# Patient Record
Sex: FEMALE | Race: WHITE
[De-identification: names, ages, dates, MRNs, and addresses within clinical notes are randomized per-mention and may not be internally consistent; named-entity substitution may affect disease eponyms.]

---

## 2017-03-29 ENCOUNTER — HOSPITAL ENCOUNTER (OUTPATIENT)
Dept: HOSPITAL 80 - FIMAGING | Age: 67
End: 2017-03-29
Attending: INTERNAL MEDICINE
Payer: COMMERCIAL

## 2017-03-29 DIAGNOSIS — C50.812: Primary | ICD-10-CM

## 2017-03-29 DIAGNOSIS — C79.51: ICD-10-CM

## 2017-03-29 PROCEDURE — 78306 BONE IMAGING WHOLE BODY: CPT

## 2017-03-29 PROCEDURE — A9503 TC99M MEDRONATE: HCPCS

## 2017-05-03 ENCOUNTER — HOSPITAL ENCOUNTER (OUTPATIENT)
Dept: HOSPITAL 80 - BHFA | Age: 67
End: 2017-05-03
Attending: INTERNAL MEDICINE
Payer: COMMERCIAL

## 2017-05-03 DIAGNOSIS — Z51.89: Primary | ICD-10-CM

## 2017-07-25 ENCOUNTER — HOSPITAL ENCOUNTER (OUTPATIENT)
Dept: HOSPITAL 80 - FIMAGING | Age: 67
End: 2017-07-25
Attending: PHYSICIAN ASSISTANT
Payer: COMMERCIAL

## 2017-07-25 DIAGNOSIS — C50.812: ICD-10-CM

## 2017-07-25 DIAGNOSIS — C79.51: Primary | ICD-10-CM

## 2017-07-25 PROCEDURE — 78306 BONE IMAGING WHOLE BODY: CPT

## 2017-07-25 PROCEDURE — A9503 TC99M MEDRONATE: HCPCS

## 2017-09-18 ENCOUNTER — HOSPITAL ENCOUNTER (OUTPATIENT)
Dept: HOSPITAL 80 - FIMAGING | Age: 67
End: 2017-09-18
Attending: INTERNAL MEDICINE
Payer: COMMERCIAL

## 2017-09-18 DIAGNOSIS — Z45.2: Primary | ICD-10-CM

## 2017-10-17 ENCOUNTER — HOSPITAL ENCOUNTER (OUTPATIENT)
Dept: HOSPITAL 80 - FIMAGING | Age: 67
End: 2017-10-17
Attending: INTERNAL MEDICINE
Payer: COMMERCIAL

## 2017-10-17 DIAGNOSIS — C79.51: Primary | ICD-10-CM

## 2017-10-17 DIAGNOSIS — C50.812: ICD-10-CM

## 2017-10-17 PROCEDURE — CP1Z1ZZ PLANAR NUCLEAR MEDICINE IMAGING OF MUSCULOSKELETAL SYSTEM, ALL USING TECHNETIUM 99M (TC-99M): ICD-10-PCS | Performed by: RADIOLOGY

## 2017-10-17 PROCEDURE — 78306 BONE IMAGING WHOLE BODY: CPT

## 2017-10-17 PROCEDURE — A9503 TC99M MEDRONATE: HCPCS

## 2017-11-15 ENCOUNTER — HOSPITAL ENCOUNTER (OUTPATIENT)
Dept: HOSPITAL 80 - FIMAGING | Age: 67
End: 2017-11-15
Attending: NURSE PRACTITIONER
Payer: COMMERCIAL

## 2017-11-15 DIAGNOSIS — R22.0: Primary | ICD-10-CM

## 2017-11-15 DIAGNOSIS — C50.812: ICD-10-CM

## 2017-11-15 DIAGNOSIS — D70.1: ICD-10-CM

## 2017-11-15 PROCEDURE — A9585 GADOBUTROL INJECTION: HCPCS

## 2017-11-15 PROCEDURE — 70553 MRI BRAIN STEM W/O & W/DYE: CPT

## 2019-02-05 ENCOUNTER — HOSPITAL ENCOUNTER (INPATIENT)
Dept: HOSPITAL 80 - FED | Age: 69
LOS: 6 days | DRG: 177 | End: 2019-02-11
Attending: INTERNAL MEDICINE | Admitting: INTERNAL MEDICINE
Payer: COMMERCIAL

## 2019-02-05 DIAGNOSIS — R41.3: ICD-10-CM

## 2019-02-05 DIAGNOSIS — C79.2: ICD-10-CM

## 2019-02-05 DIAGNOSIS — I26.99: ICD-10-CM

## 2019-02-05 DIAGNOSIS — C78.00: ICD-10-CM

## 2019-02-05 DIAGNOSIS — Z51.5: ICD-10-CM

## 2019-02-05 DIAGNOSIS — F32.9: ICD-10-CM

## 2019-02-05 DIAGNOSIS — D53.9: ICD-10-CM

## 2019-02-05 DIAGNOSIS — Z90.13: ICD-10-CM

## 2019-02-05 DIAGNOSIS — G62.9: ICD-10-CM

## 2019-02-05 DIAGNOSIS — E86.9: ICD-10-CM

## 2019-02-05 DIAGNOSIS — J96.01: ICD-10-CM

## 2019-02-05 DIAGNOSIS — C50.919: ICD-10-CM

## 2019-02-05 DIAGNOSIS — J69.0: Primary | ICD-10-CM

## 2019-02-05 DIAGNOSIS — Z66: ICD-10-CM

## 2019-02-05 DIAGNOSIS — C79.51: ICD-10-CM

## 2019-02-05 LAB — PLATELET # BLD: 282 10^3/UL (ref 150–400)

## 2019-02-05 PROCEDURE — G0378 HOSPITAL OBSERVATION PER HR: HCPCS

## 2019-02-05 RX ADMIN — ONDANSETRON PRN MG: 2 SOLUTION INTRAMUSCULAR; INTRAVENOUS at 19:45

## 2019-02-05 RX ADMIN — MORPHINE SULFATE SCH MG: 15 TABLET, FILM COATED, EXTENDED RELEASE ORAL at 21:59

## 2019-02-05 NOTE — PDGENHP
History and Physical





- Chief Complaint


shortness of breath, fatigue





- History of Present Illness


69yo F with breast cancer with metastases to brain, bone, lung, and skin 

presents with shortness of breath and fatigue. These symptoms started acutely 

after beginning a new anti-cancer therapy, neratinib in early January. She 

reports that her oncologist had reduced the dose of this medication but she was 

still exquisitely fatigued so came to ED today. She was found to have an oxygen 

saturation of 72% on room air. CXR shows what appeared to be bilateral 

pneumonia and she was given a dose of ceftriaxone and azithromycin. Given her 

history of malignancy and an elevated d-dimer, CTA of her chest was performed 

which showed right middle lobe PE and bilateral lower lobe infiltrates with air 

bronchograms. She is being admitted for further management. Case discussed with 

ED physician Shasta Jacobs.





History Information





- Allergies/Home Medication List


Allergies/Adverse Reactions: 








SEASONAL Allergy (Intermediate, Uncoded 12/11/12 10:18)


 RUNNY NOSE/ ITCHY EYES





Home Medications: 








Citalopram [CeleXA 20 MG] 20 mg PO DAILY 05/20/14 [Last Taken 02/05/19]


Herbals/Supplements -Info Only 1 ea PO DAILY 06/03/16 [Last Taken 06/03/16]


Acetaminophen [Tylenol 325mg (*)] 325 mg PO BID 02/05/19 [Last Taken 02/05/19]


Ascorbic Acid [Vitamin C 500 mg (*)] 500 mg PO DAILY 02/05/19 [Last Taken 02/05/ 19]


Levothyroxine [Synthroid 50 mcg (*)] 50 mcg PO DAILY06 02/05/19 [Last Taken 02/ 05/19]


Neratinib Maleate [Nerlynx] 200 mg PO HS 02/05/19 [Last Taken 02/03/19]


Ondansetron HCl [Zofran] 8 mg PO BID 02/05/19 [Last Taken 02/05/19]


Pantoprazole Sodium [Protonix 40mg (*)] 40 mg PO DAILY 02/05/19 [Last Taken 02/ 05/19]


Silver Sulfadiazine [Ssd] 1 amanda TP Q2D PRN 02/05/19 [Last Taken 02/03/19]


morphINE SR [Ms Contin/Oramorph 15 mg (*)] 15 mg PO BID 02/05/19 [Last Taken 02/ 05/19]


oxyCODONE IR [Oxycodone Ir (*)] 5 mg PO Q6 PRN 02/05/19 [Last Taken Unknown]





I have personally reviewed and updated: family history, medical history, social 

history, surgical history





- Past Medical History


Additional medical history: metastatic HER2 + breast cancer (brain s/p 

stereotactic resection 12/2018, lung, skin, bone), depression, peripheral 

neuropathy, memory loss, iron deficiency anemia





- Surgical History


Additional surgical history: bilateral mastectomy w/axillary node dissection (5/ 2014)





- Family History


Positive for: non-pertinent





- Social History


Smoking Status: Never smoked


Alcohol Use: None


Drug Use: None





Review of Systems


Review of Systems: 





ROS: 10pt was reviewed & negative except for what was stated in HPI & below





Physical Exam


Physical Exam: 

















Temp Pulse Resp BP Pulse Ox


 


 36.4 C   79   16   111/63   96 


 


 02/05/19 16:41  02/05/19 21:14  02/05/19 21:14  02/05/19 16:41  02/05/19 21:14




















O2 (L/minute)                  10














Constitutional: no apparent distress, appears nourished, not in pain


Eyes: PERRL, anicteric sclera, EOMI


Ears, Nose, Mouth, Throat: moist mucous membranes, hearing normal, ears appear 

normal, no oral mucosal ulcers


Cardiovascular: regular rate and rhythym, no murmur, rub, or gallop, No JVD, No 

edema


Respiratory: inspiratory crackles, respiratory distress, No expiratory wheeze


Gastrointestinal: normoactive bowel sounds, soft, non-tender abdomen, no 

palpable masses


Genitourinary: no bladder fullness, no bladder tenderness


Skin: warm


Musculoskeletal: full muscle strength


Neurologic: AAOx3, CN II-XII Intact, other (intermittently forgetful)


Psychiatric: interacting appropriately





Lab Data & Imaging Review





 02/05/19 18:25





 02/05/19 18:25














WBC  15.31 10^3/uL (3.80-9.50)  H  02/05/19  18:25    


 


RBC  2.86 10^6/uL (4.18-5.33)  L  02/05/19  18:25    


 


Hgb  9.8 g/dL (12.6-16.3)  L  02/05/19  18:25    


 


Hct  28.9 % (38.0-47.0)  L  02/05/19  18:25    


 


MCV  101.0 fL (81.5-99.8)  H  02/05/19  18:25    


 


MCH  34.3 pg (27.9-34.1)  H  02/05/19  18:25    


 


MCHC  33.9 g/dL (32.4-36.7)   02/05/19  18:25    


 


RDW  13.8 % (11.5-15.2)   02/05/19  18:25    


 


Plt Count  282 10^3/uL (150-400)   02/05/19  18:25    


 


MPV  9.5 fL (8.7-11.7)   02/05/19  18:25    


 


Neut % (Auto)  Not Reported   02/05/19  18:25    


 


Lymph % (Auto)  Not Reported   02/05/19  18:25    


 


Mono % (Auto)  Not Reported   02/05/19  18:25    


 


Eos % (Auto)  Not Reported   02/05/19  18:25    


 


Baso % (Auto)  Not Reported   02/05/19  18:25    


 


Nucleat RBC Rel Count  Not Reported   02/05/19  18:25    


 


Absolute Neuts (auto)  Not Reported   02/05/19  18:25    


 


Absolute Lymphs (auto)  Not Reported   02/05/19  18:25    


 


Absolute Monos (auto)  Not Reported   02/05/19  18:25    


 


Absolute Eos (auto)  Not Reported   02/05/19  18:25    


 


Absolute Basos (auto)  Not Reported   02/05/19  18:25    


 


Absolute Nucleated RBC  Not Reported   02/05/19  18:25    


 


Immature Gran %  Not Reported   02/05/19  18:25    


 


Seg Neutrophils %  90.0 %  02/05/19  18:25    


 


Band Neutrophils %  0.0 %  02/05/19  18:25    


 


Lymphocytes %  2.0 %  02/05/19  18:25    


 


Monocytes %  8.0 %  02/05/19  18:25    


 


Eosinophils %  0.0 %  02/05/19  18:25    


 


Basophils %  0.0 %  02/05/19  18:25    


 


Metamyelocytes %  0.0 %  02/05/19  18:25    


 


Myelocytes %  0.0 %  02/05/19  18:25    


 


Promyelocytes %  0.0 %  02/05/19  18:25    


 


Blast Cells %  0.0 %  02/05/19  18:25    


 


Immature Gran #  Not Reported   02/05/19  18:25    


 


Absolute Seg Neuts  13.78 10^3/uL (1.70-6.50)  H  02/05/19  18:25    


 


Absolute Band Neuts  0.00 10^3/uL (0.00-0.70)   02/05/19  18:25    


 


Absolute Lymphocytes  0.31 10^3/uL (1.00-3.00)  L  02/05/19  18:25    


 


Absolute Monocytes  1.22 10^3/uL (0.30-0.80)  H  02/05/19  18:25    


 


Absolute Eosinophils  0.00 10^3/uL (0.03-0.40)  L  02/05/19  18:25    


 


Absolute Basophils  0.00 10^3/uL (0.02-0.10)  L  02/05/19  18:25    


 


Absolute Metamyelocyte  0.00 10^3/mL (0.00-0.00)   02/05/19  18:25    


 


Absolute Myelocytes  0.00 10^3/mL (0.00-0.00)   02/05/19  18:25    


 


Absolute Promyelocytes  0.00 10^3/uL (0.00-0.00)   02/05/19  18:25    


 


Absolute Plasma Cells  0.00 10^3/uL (0.00-0.00)   02/05/19  18:25    


 


Nucleated RBCs  0 /100 WBC (0-0)   02/05/19  18:25    


 


Absolute Blast Cells  0.00 10^3/uL (0.00-0.00)   02/05/19  18:25    


 


Plasma Cells %  0.0 %  02/05/19  18:25    


 


Platelet Estimate  ADEQUATE  (ADEQ)   02/05/19  18:25    


 


Oval Macrocytes  1+  H  02/05/19  18:25    


 


D-Dimer  9.84 ug/mLFEU (0.00-0.50)  H  02/05/19  18:25    


 


VBG Lactic Acid  1.7 mmol/L (0.7-2.1)   02/05/19  18:25    


 


Sodium  133 mEq/L (135-145)  L  02/05/19  18:25    


 


Potassium  3.8 mEq/L (3.5-5.2)   02/05/19  18:25    


 


Chloride  109 mEq/L ()   02/05/19  18:25    


 


Carbon Dioxide  16 mEq/l (22-31)  L  02/05/19  18:25    


 


Anion Gap  8 mEq/L (6-14)   02/05/19  18:25    


 


BUN  31 mg/dL (7-23)  H  02/05/19  18:25    


 


Creatinine  1.0 mg/dL (0.6-1.0)   02/05/19  18:25    


 


Estimated GFR  55   02/05/19  18:25    


 


Glucose  108 mg/dL ()  H  02/05/19  18:25    


 


Calcium  7.7 mg/dL (8.5-10.4)  L  02/05/19  18:25    


 


NT-Pro-B Natriuret Pep  3490 pg/mL (0-125)  H  02/05/19  18:25    








Interpretation: CXR: bilateral lower lung field alveolar and interstitial 

opacities, no effusions, normal heart size, chest port in place


EKG additional interpertation: ECG: NSR, no significant right heart strain, no 

acute ischemia





Assessment & Plan


Assessment: 





69yo F with breast cancer with metastases to brain, bone, lung, and skin 

presents with shortness of breath and fatigue found to be hypoxic. Evaluation 

revealed PE and pneumonia vs pneumonitis.


Plan: 





1. Acute hypoxemic respiratory failure: Evidenced by respiratory distress and 

O2 saturation of 72% on room air. Due to PE and airspace disease.


   - Management of lung issues per below. Wean supplemental O2 as able





2. Pulmonary embolus: Acute vs subacute. At risk with cancer. PESI score 120 

indicating high risk. BNP elevated but no right heart strain on CT. 


   - I recommended starting weight based LMWH. She does have brain metastases 

but it's my opinion that benefits of anticoagulation outweigh risks in this 

situation.


   - Patient refusing this intervention, wants to discuss further with family 

and oncology. I explained the risks of not anticoagulating - worsening clot 

propagation, hypoxia, right heart strain, shock, death.





3. Bilateral groundglass opacities: Query pneumonia vs chemo-induced 

pneumonitis. Resp viral PCR negative.


   - Treat for CAP with CTX and azithromycin for now


   - Check procalcitonin


   - Holding neratinib





4. Metastatic breast cancer


   - Alert oncology of admission in AM





5. Anemia: Hgb slightly lower than baseline. Would monitor closely once/if 

anticoagulation started. 





6. Memory loss: Related to brain mets. She is oriented but frequently forgetful 

when speaking with her.





Code: DNR


Diet: regular


Dispo: Admit as inpatient

## 2019-02-05 NOTE — EDPHY
H & P


Stated Complaint: Pt SOB and fatigue x5D, syncopex2, SpO2 73RA, placed on O2, 

Rlung crackles


Time Seen by Provider: 02/05/19 17:04


HPI/ROS: 





CHIEF COMPLAINT:  Shortness of breath





HISTORY OF PRESENT ILLNESS:  68-year-old female with stage IV breast cancer 

presents with shortness of breath.  Onset of shortness of breath 2 weeks ago, 

gradually increasing.  Now short of breath at rest.  Oxygen saturation was 72% 

on room air prior to arrival.  Associated with a cough x1 day and generalized 

weakness.  Intermittent vomiting and diarrhea throughout last few weeks 

secondary to chemotherapy.  No other URI symptoms or fever.  Last chemotherapy 

was yesterday.





REVIEW OF SYSTEMS:  complete 10 point ROS reviewed and is negative except for 

the noted elements in the HPI








- Personal History


Current Tetanus/Diphtheria Vaccine: Yes


Tetanus Vaccine Date: 11/14/12





- Medical/Surgical History


PMH: 





Stage IV breast cancer





Hx Asthma: No


Hx Chronic Respiratory Disease: No


Hx Diabetes: No


Hx Cardiac Disease: No


Hx Renal Disease: No


Hx Cirrhosis: No


Hx Alcoholism: No


Hx HIV/AIDS: No


Hx Splenectomy or Spleen Trauma: No


Other PMH: Collagenous colitis, hypothyroid, depression, knee surgery '76, 

shoulder surgery 12





- Social History


Smoking Status: Never smoked


Alcohol Use: Sober


Drug Use: None





- Physical Exam


Exam: 





General Appearance:  Alert, pleasant, nontoxic-appearing


Eyes:  Pupils equal and round, no conjunctival pallor or injection


ENT, Mouth:  Mucous membranes moist


Neck:  Normal inspection


Respiratory:  Lungs are clear to auscultation


Cardiovascular:  Regular rate and rhythm


Gastrointestinal:  Abdomen is soft and nontender


Neurological:  A&O, nonfocal exam


Skin:  Warm and dry, no rash


Ext:  Nontender, no pedal edema


Psychiatric:  Mood and affect normal





Constitutional: 


 Initial Vital Signs











Temperature (C)  36.4 C   02/05/19 16:41


 


Heart Rate  83   02/05/19 16:41


 


Respiratory Rate  18   02/05/19 16:41


 


Blood Pressure  111/63   02/05/19 16:41


 


O2 Sat (%)  92   02/05/19 16:41








 











O2 Delivery Mode               Nasal Cannula


 


O2 (L/minute)                  3














Allergies/Adverse Reactions: 


 





SEASONAL Allergy (Intermediate, Uncoded 12/11/12 10:18)


 RUNNY NOSE/ ITCHY EYES








Home Medications: 














 Medication  Instructions  Recorded


 


Citalopram [CeleXA 20 MG] 20 mg PO DAILY 05/20/14


 


Herbals/Supplements -Info Only 1 ea PO DAILY 06/03/16


 


Acetaminophen [Tylenol 325mg (*)] 325 mg PO BID 02/05/19


 


Ascorbic Acid [Vitamin C 500 mg 500 mg PO DAILY 02/05/19





(*)]  


 


Levothyroxine [Synthroid 50 mcg 50 mcg PO DAILY06 02/05/19





(*)]  


 


Neratinib Maleate [Nerlynx] 200 mg PO HS 02/05/19


 


Ondansetron HCl [Zofran] 8 mg PO BID 02/05/19


 


Pantoprazole Sodium [Protonix 40mg 40 mg PO DAILY 02/05/19





(*)]  


 


Silver Sulfadiazine [Ssd] 1 amanda TP Q2D PRN 02/05/19


 


morphINE SR [Ms Contin/Oramorph 15 15 mg PO BID 02/05/19





mg (*)]  


 


oxyCODONE IR [Oxycodone Ir (*)] 5 mg PO Q6 PRN 02/05/19














Medical Decision Making





- Diagnostics


Imaging Results: 


Chest X-Ray  02/05/19 16:48


Impression: 


1. Bilateral perihilar hazy opacities with retrocardiac reticulation. Findings 

may represent pneumonia or pneumonitis. 


2. Enlarging left lung nodule. 





CT pulmonary angiogram reveals bilateral infiltrates consistent with pneumonia, 

as well as pulmonary embolism.





 


Imaging: Discussed imaging studies w/ On call Radiologist, I viewed and 

interpreted images myself


ED Course/Re-evaluation: 


This patient presents with gradually increasing shortness of breath, associated 

with cough.  Chest x-ray reveals bilateral infiltrates, consistent with 

pneumonia.  Leukocytosis present.  Initial lactate is normal.  Blood cultures 

were drawn and Rocephin and Zithromax IV given for probable pneumonia.  The 

hospitalist service was consulted for admission.





7pm: Ddimer elevated, CTA chest ordered.  CT pulmonary angiogram reveals 

bilateral pneumonia as well as pulmonary embolism.  These results were 

discussed with Dr. Duke, as the patient had already gone to the floor for 

admission.  He will decide on appropriate anticoagulation for this patient.





Differential Diagnosis: 





Differential diagnosis includes though it is not limited to pneumonia, 

pneumothorax, pulmonary embolism, aortic dissection, pericarditis, acute 

coronary syndrome.





- Data Points


Laboratory Results: 


 Laboratory Results





 02/05/19 18:25 





 02/05/19 18:25 








Microbiology Results: 


 MICROBIOLOGY





02/05/19 18:45   Blood   Blood Culture - Preliminary


02/05/19 18:25   Blood   Blood Culture - Preliminary





Medications Given: 


 





Albuterol (Proventil Neb)  3 ml IH Q4H PRN


   PRN Reason: WHEEZING/DYSPNEA


   Stop: 08/05/19 11:44


   Last Admin: 02/07/19 13:33 Dose:  3 ml


Azithromycin (Zithromax)  500 mg PO DAILY TRISTAN


   PRN Reason: Protocol


   Stop: 03/08/19 08:59


   Last Admin: 02/07/19 08:57 Dose:  500 mg


Citalopram Hydrobromide (Celexa)  20 mg PO DAILY TRISTAN


   Stop: 08/05/19 08:59


   Last Admin: 02/07/19 08:57 Dose:  20 mg


Enoxaparin Sodium (Lovenox)  48 mg SC BID [facility]


   Stop: 08/05/19 10:44


   Last Admin: 02/07/19 09:00 Dose:  48 mg


Ceftriaxone Sodium/Dextrose (Rocephin 1 Gm (Premix))  50 mls @ 100 mls/hr IV 

DAILY TRISTAN


   PRN Reason: Protocol


   Stop: 03/08/19 08:59


   Last Admin: 02/07/19 08:57 Dose:  50 mls


Levothyroxine Sodium (Synthroid)  50 mcg PO DAILY06 [facility]


   Stop: 08/05/19 05:59


   Last Admin: 02/07/19 08:57 Dose:  50 mcg


Miscellaneous Medication (Neratinib Maleate [Nerlynx])  200 mg PO HS [facility]


   Stop: 08/04/19 21:14


   Last Admin: 02/05/19 22:01 Dose:  Not Given


Morphine Sulfate (Ms Contin/Oramorph)  15 mg PO BID TRISTAN


   Stop: 02/15/19 21:14


   Last Admin: 02/07/19 08:58 Dose:  15 mg


Ondansetron HCl (Zofran)  4 mg IVP Q4HRS PRN


   PRN Reason: Nausea/Vomiting, Can't Take PO


   Stop: 08/04/19 19:45


   Last Admin: 02/05/19 19:45 Dose:  4 mg


Ondansetron HCl (Zofran Odt)  4 mg PO Q4HRS PRN


   PRN Reason: Nausea/Vomiting, Use 1st


   Stop: 08/04/19 19:45


   Last Admin: 02/05/19 20:49 Dose:  4 mg


Oxycodone HCl (Oxycodone Ir)  5 mg PO Q6 PRN


   PRN Reason: Pain, Severe Able to Take PO


   Stop: 02/15/19 21:13


   Last Admin: 02/07/19 03:41 Dose:  5 mg


Pantoprazole Sodium (Protonix)  40 mg PO DAILY [facility]


   Stop: 08/05/19 08:59


   Last Admin: 02/07/19 08:58 Dose:  40 mg


Promethazine HCl (Phenergan)  12.5 mg IVP Q6HRS PRN


   PRN Reason: Nausea/Vomiting, Can't Take PO


   Stop: 08/04/19 20:40


   Last Admin: 02/05/19 21:03 Dose:  12.5 mg





Discontinued Medications





Albuterol/Ipratropium (Duoneb)  3 ml IH QID [facility]


   Stop: 08/04/19 20:59


   Last Admin: 02/05/19 21:13 Dose:  3 ml


Azithromycin 500 mg/ Sodium (Chloride)  255 mls @ 255 mls/hr IV EDNOW ONE


   PRN Reason: Protocol


   Stop: 02/05/19 19:23


   Last Admin: 02/05/19 19:23 Dose:  255 mls


Ceftriaxone Sodium/Dextrose (Rocephin 1 Gm (Premix))  50 mls @ 100 mls/hr IV 

EDNOW ONE


   PRN Reason: Protocol


   Stop: 02/05/19 18:53


   Last Admin: 02/05/19 18:49 Dose:  50 mls


Sodium Chloride (Ns)  1,000 mls @ 0 mls/hr IV ONCE ONE; Wide Open


   PRN Reason: Protocol


   Stop: 02/05/19 18:41


   Last Admin: 02/05/19 18:53 Dose:  1,000 mls


Methylprednisolone Sodium Succinate (Solu-Medrol)  60 mg IVP Q6HRS [facility]


   Stop: 08/06/19 11:59


   Last Admin: 02/07/19 13:07 Dose:  60 mg


Oxycodone HCl (Oxycodone Ir)  5 - 10 mg PO Q3HRS PRN


   PRN Reason: Pain, Severe Able to Take PO


   Stop: 02/15/19 19:45


   Last Admin: 02/05/19 20:54 Dose:  10 mg








Departure





- Departure


Disposition: [facility]


Clinical Impression: 


Pneumonia


Qualifiers:


 Pneumonia type: due to unspecified organism Laterality: bilateral Lung location

: unspecified part of lung Qualified Code(s): J18.9 - Pneumonia, unspecified 

organism





Pulmonary embolism


Qualifiers:


 Pulmonary embolism type: unspecified Chronicity: acute Acute cor pulmonale 

presence: without acute cor pulmonale Qualified Code(s): I26.99 - Other 

pulmonary embolism without acute cor pulmonale





Condition: Serious

## 2019-02-05 NOTE — CPEKG
Test Reason : OPEN

Blood Pressure : ***/*** mmHG

Vent. Rate : 067 BPM     Atrial Rate : 068 BPM

   P-R Int : 157 ms          QRS Dur : 093 ms

    QT Int : 451 ms       P-R-T Axes : 047 003 -14 degrees

   QTc Int : 476 ms

 

Sinus rhythm

Abnrm T, consider ischemia, anterolateral lds

 

Confirmed by Shasta Jacobs ([id number]) on 2/5/2019 9:00:17 PM

 

Referred By: Shasta Jacobs           Confirmed By:Shasta Jacobs

## 2019-02-06 LAB — PLATELET # BLD: 241 10^3/UL (ref 150–400)

## 2019-02-06 RX ADMIN — ENOXAPARIN SODIUM SCH MG: 100 INJECTION SUBCUTANEOUS at 20:11

## 2019-02-06 RX ADMIN — LEVOTHYROXINE SODIUM SCH MCG: 50 TABLET ORAL at 05:36

## 2019-02-06 RX ADMIN — MORPHINE SULFATE SCH MG: 15 TABLET, FILM COATED, EXTENDED RELEASE ORAL at 20:13

## 2019-02-06 RX ADMIN — ENOXAPARIN SODIUM SCH MG: 100 INJECTION SUBCUTANEOUS at 11:51

## 2019-02-06 RX ADMIN — ALBUTEROL SULFATE PRN ML: 2.5 SOLUTION RESPIRATORY (INHALATION) at 12:04

## 2019-02-06 RX ADMIN — MORPHINE SULFATE SCH MG: 15 TABLET, FILM COATED, EXTENDED RELEASE ORAL at 08:36

## 2019-02-06 RX ADMIN — CITALOPRAM HYDROBROMIDE SCH MG: 20 TABLET ORAL at 08:37

## 2019-02-06 RX ADMIN — PANTOPRAZOLE SODIUM SCH MG: 40 TABLET, DELAYED RELEASE ORAL at 08:36

## 2019-02-06 RX ADMIN — AZITHROMYCIN SCH MG: 250 TABLET, FILM COATED ORAL at 08:36

## 2019-02-06 NOTE — ASMTCMCOM
CM Note

 

CM Note                       

Notes:

Pt admitted to hospital with sob, angio CT shows PE. Pt also has a hx of breast ca 

w/mets. Currently on 15L O2, dc needs uncertain, CM w/f.



DC Plan: TBD

 

Date Signed:  02/06/2019 06:20 PM

Electronically Signed By:Mónica Fletcher RN

## 2019-02-06 NOTE — HOSPPROG
Hospitalist Progress Note


Assessment/Plan: 


68-year-old female with past medical history of stage IV metastatic breast 

cancer, with mets to the brain bone and lung admitted with acute pulmonary 

embolism and likely bacterial pneumonia.


Plan: 





Acute hypoxemic respiratory failure:  Requiring 15 L high-flow mask currently.  

Etiology likely secondary to pneumonia and pulmonary embolism.  Initially 

refused Lovenox treatment but after some discussion with patient and family now 

amenable to it. 


-treat pneumonia with Rocephin and azithromycin


-treat PE with Lovenox twice daily


-oxygen p.r.n.


-nebs


-monitor cultures





Pulmonary embolus:  CT reviewed showing subsegmental pulmonary embolus.  

Patient initially refusing Lovenox.  After some discussion patient and family 

more amenable to it now.  I discussed the case with Hematology/Oncology who, 

despite brain Mets, feels anticoagulation is warranted given her respiratory 

status.


-start Lovenox now





Bilateral groundglass opacities: Query pneumonia vs chemo-induced pneumonitis. 

Resp viral PCR negative.


   - Treat for CAP with CTX and azithromycin for now


   - Holding neratinib





Metastatic breast cancer- oncology will see patient while here.  Discussion 

with patient's family suggest prognosis is grim.


   - oncology will see patient today





Anemia: Hgb slightly lower than baseline. Would monitor closely once/if 

anticoagulation started. 





Memory loss: Related to brain mets. She is oriented but frequently forgetful 

when speaking with her.





Fluids- gentle intravenous saline


Electrolytes- within normal limits


Nutrition regular diet


Cor DNR


Dispo inpatient for acute hypoxemic respiratory failure with acute pulmonary 

embolus and bacterial pneumonia in setting of stage IV metastatic breast cancer





Subjective: Patient short of breath but in no pain


Objective: 


 Vital Signs











Temp Pulse Resp BP Pulse Ox


 


 37.3 C   96   18   101/50 L  94 


 


 02/06/19 12:00  02/06/19 12:00  02/06/19 12:00  02/06/19 12:00  02/06/19 12:00








 Laboratory Results





 02/06/19 05:43 





 02/06/19 05:43 





 











 02/05/19 02/06/19 02/07/19





 05:59 05:59 05:59


 


Intake Total  25 


 


Balance  25 














- Physical Exam


Constitutional: chronically ill appearing, cachectic


Eyes: PERRL, anicteric sclera, EOMI


Ears, Nose, Mouth, Throat: moist mucous membranes, hearing normal, ears appear 

normal, no oral mucosal ulcers


Cardiovascular: tachycardia


Respiratory: reduced air movement, inspiratory crackles


Gastrointestinal: normoactive bowel sounds, soft, non-tender abdomen, no 

palpable masses


Genitourinary: no bladder fullness, no bladder tenderness, no renal bruits


Skin: no rashes or abrasions, no fluctuance, no induration


Musculoskeletal: generalized weakness


Neurologic: AAOx3


Psychiatric: poor memory


Lymph, Heme, Immunologic: no cervical LAD





ICD10 Worksheet


Patient Problems: 


 Problems











Problem Status Onset


 


Adenocarcinoma of breast Acute  


 


Breast cancer metastasized to brain Acute fever/burning on urination/back pain

## 2019-02-06 NOTE — PDMN
Medical Necessity


Medical necessity: MCG M290 A-4 days: 67 yo w/ acute PE and bacterial pneumonia 

in setting of stage IV breast ca w/ mets brain/bone/lung in acute hypoxemic 

resp failure w/ SOB requiring 15L high flow mask still w/ difficulty maintain 

sats in 90s.  Change to IP status 2/6/19@1706 per MD order.

## 2019-02-07 LAB — PLATELET # BLD: 188 10^3/UL (ref 150–400)

## 2019-02-07 RX ADMIN — CITALOPRAM HYDROBROMIDE SCH MG: 20 TABLET ORAL at 08:57

## 2019-02-07 RX ADMIN — AZITHROMYCIN SCH MG: 250 TABLET, FILM COATED ORAL at 08:57

## 2019-02-07 RX ADMIN — ENOXAPARIN SODIUM SCH MG: 100 INJECTION SUBCUTANEOUS at 09:00

## 2019-02-07 RX ADMIN — OXYCODONE HYDROCHLORIDE PRN MG: 15 TABLET ORAL at 03:41

## 2019-02-07 RX ADMIN — LEVOTHYROXINE SODIUM SCH MCG: 50 TABLET ORAL at 08:57

## 2019-02-07 RX ADMIN — PANTOPRAZOLE SODIUM SCH MG: 40 TABLET, DELAYED RELEASE ORAL at 08:58

## 2019-02-07 RX ADMIN — ENOXAPARIN SODIUM SCH MG: 100 INJECTION SUBCUTANEOUS at 21:04

## 2019-02-07 RX ADMIN — MORPHINE SULFATE SCH MG: 15 TABLET, FILM COATED, EXTENDED RELEASE ORAL at 08:58

## 2019-02-07 RX ADMIN — MORPHINE SULFATE SCH MG: 15 TABLET, FILM COATED, EXTENDED RELEASE ORAL at 21:04

## 2019-02-07 RX ADMIN — OXYCODONE HYDROCHLORIDE PRN MG: 15 TABLET ORAL at 19:50

## 2019-02-07 RX ADMIN — ALBUTEROL SULFATE PRN ML: 2.5 SOLUTION RESPIRATORY (INHALATION) at 13:33

## 2019-02-07 NOTE — ASMTCMCOM
CM Note

 

CM Note                       

Notes:

Pts case discussed w/ Dr. Spangler. Pt is on 15L of o2. CM spoke to pts sister in law Kimi 

(P#: [phone]) who is a NP. Kimi reports that she has brought pts 5 wishes in. Kimi reports 


that pt would like to be comfort measures only. Kimi would like palliative to be 

ordered. Kimi reports that pt will be getting the meds for the assisted suicide. CM to follow.







Plan: TBD

 

Date Signed:  02/07/2019 03:10 PM

Electronically Signed By:BERNARD Rowell

## 2019-02-07 NOTE — SOAPPROG
SOAP Progress Note


Assessment/Plan: 


Assessment:





- metastatic breast CA - recent treatment with paclitaxel and neratinib - it is 

somewhat unclear, but her current pneumonitis may be treatment related.


- pneumonitis/resp failure - Her O2 requirements are increasing. The plan is to 

transfer her to the SDU for closer monitoring and optimization





The patient's goal is to improve enough to get back home.








Plan:





txn to SDU per hospitalist service


I discussed the patient's current status with her primary oncologist (Dr. Karine Villegas), who will be rounding for us over the weekend.





Subjective: 





Very breathless when going to bathroom.


Objective: 





 Vital Signs











Temp Pulse Resp BP Pulse Ox


 


 36.7 C   72   18   115/58 L  93 


 


 02/07/19 08:00  02/07/19 08:00  02/07/19 08:00  02/07/19 08:00  02/07/19 08:00








 Laboratory Results





 02/07/19 13:30 





 02/07/19 13:30 





 











 02/05/19 02/06/19 02/07/19





 23:59 23:59 23:59


 


Intake Total  25 


 


Balance  25 














Physical Exam





- Physical Exam


General Appearance: alert, moderate distress


Respiratory: respiratory distress, crackles, rales, rhonchi, No wheezing


Cardiac/Chest: tachycardia


Abdomen: normal bowel sounds


Skin: warm/dry


Neuro/Psych: alert, oriented x 3





ICD10 Worksheet


Patient Problems: 


 Problems











Problem Status Onset


 


Adenocarcinoma of breast Acute  


 


Breast cancer metastasized to brain Acute

## 2019-02-07 NOTE — HOSPPROG
Hospitalist Progress Note


Assessment/Plan: 


68-year-old female with past medical history of stage IV metastatic breast 

cancer, with mets to the brain bone and lung admitted with acute pulmonary 

embolism, acute hypoxemic respiratory failure, and possible pneumonitis.


Plan: 





Acute hypoxemic respiratory failure:  Requiring 15 L high-flow mask on 

admission. Oxygen requirements increased today. she got up to go to the 

bathroom and sats dropped to 70% and never came back up. Now on Vapotherm at 25 

liters.  Etiology likely secondary to CAP,pulmonary embolism, but possibly she 

has a pneumonitis due to chemo.   Initially refused Lovenox treatment but after 

some discussion with patient and family now amenable to it. 


-treat pneumonia with Rocephin and azithromycin


-treat PE with Lovenox twice daily


-started solumedrol today


-pulm consulted


-transfer to SDU


-TTE pending


-oxygen p.r.n.


-nebs


-monitor cultures





Pulmonary embolus:  CT reviewed showing subsegmental pulmonary embolus.  

Patient initially refusing Lovenox.  After some discussion patient and family 

more amenable to it now.  I discussed the case with Hematology/Oncology who, 

despite brain Mets, feels anticoagulation is warranted given her respiratory 

status.


-cont lovenox





Bilateral groundglass opacities: Query pneumonia vs chemo-induced pneumonitis. 

Resp viral PCR negative.


- Treat for CAP with CTX and azithromycin for now


-pulmonology consulted, starting steroids


- Holding neratinib








Metastatic breast cancer- oncology will see patient while here.  Discussion 

with patient's family suggest prognosis is grim.


- oncology following, holding 





Anemia: Hgb slightly lower than baseline. Would monitor closely once/if 

anticoagulation started. 





Memory loss: Related to brain mets. She is oriented but frequently forgetful 

when speaking with her.





Fluids- gentle intravenous saline


Electrolytes- within normal limits


Nutrition regular diet


Cor DNR


Dispo inpatient for acute hypoxemic respiratory failure with acute pulmonary 

embolus and bacterial pneumonia in setting of stage IV metastatic breast cancer





Subjective: no complaints. feels breathing is about the same.


Objective: 


 Vital Signs











Temp Pulse Resp BP Pulse Ox


 


 36.8 C   83   20   96/50 L  92 


 


 02/07/19 15:50  02/07/19 15:50  02/07/19 15:50  02/07/19 15:50  02/07/19 15:50








 Laboratory Results





 02/07/19 13:30 





 02/07/19 13:30 





 











 02/06/19 02/07/19 02/08/19





 05:59 05:59 05:59


 


Intake Total 25  


 


Balance 25  














- Physical Exam


Constitutional: no apparent distress, appears nourished, not in pain


Eyes: PERRL, anicteric sclera, EOMI


Ears, Nose, Mouth, Throat: moist mucous membranes, hearing normal, ears appear 

normal, no oral mucosal ulcers


Cardiovascular: regular rate and rhythym, no murmur, rub, or gallop


Respiratory: reduced air movement, inspiratory crackles


Gastrointestinal: normoactive bowel sounds, soft, non-tender abdomen, no 

palpable masses


Genitourinary: no bladder fullness, no bladder tenderness, no renal bruits


Skin: no rashes or abrasions, no fluctuance, no induration


Musculoskeletal: generalized weakness


Neurologic: AAOx3


Psychiatric: interacting appropriately, poor insight, poor memory


Lymph, Heme, Immunologic: no cervical LAD, no supraclavicular LAD





ICD10 Worksheet


Patient Problems: 


 Problems











Problem Status Onset


 


Adenocarcinoma of breast Acute  


 


Breast cancer metastasized to brain Acute

## 2019-02-07 NOTE — ECHO
[email or web address]





[address] [address] 

Main: [phone] 



Fax: 



Transthoracic Echocardiogram 

Name:             JOO ARITA                               MR#:

[id number]

Study Date:       2019                               Study Time:

02:32 PM

YOB: 1950                               Age:

68 year(s)

Height:           162.6 cm (64 in.)                        Weight:

47.63 kg (105 lb.)

BSA:              1.49 m2                                  Gender:

Female

Examination:      Echo                                     Indication:

Shortness of breath, cardiomegaly

Image Quality:    Adequate                                 Contrast: 

Requested by:     Luc Peres                     BP:

115 mmHg/58 mmHg

Heart Rate:                                                Rhythm: 

Indication:       Shortness of breath, cardiomegaly 



Procedure Staff 

Ultrasound Technician:   Shakira Baldwin RDCS 

Reading Physician:       Duong Nicole MD 

Requesting Provider: 



Conclusions:           Normal study 



Measurements: 

Chambers                     Valvular Assessment AV/MV

Valvular Assessment TV/PV



Normal                                    Normal

Normal

Name         Value      Range              Name         Value Range

Name           Value Range

Ao Key (2D): 2.7 cm     (1.4 cm-2.6            AV Vmax:     1.53 m/s

(1 m/s-1.7        TR Vmax:       3.00 mm/s ( - )



cm)                                   m/s)             TR PGmax:

36 mmHg ( - )

IVSd (2D):   0.9 cm (0.6 cm-1.1                AV maxP mmHg ( -

)           syst. PAP: 41 mmHg   ( - )



cm)                AV meanP mmHg ( - )           PV Vmax:

0.81 m/s (0.6 m/s-0.9

LVDd (2D):   3.8 cm     (3.9 cm-5.3            LANG (VTI):   2.3 cm ( -

)                                m/s)



cm)                MV E Vmax:   0.89 m/s ( - )         PV PGmax:

3  mmHg ( - )

LVDs (2D):   2.3 cm     (2.1 cm-4              MV A Vmax:   0.90 m/s (

- )



cm)                MV E/A:      0.99  ( - )  

LVPWd (2D):  0.8 cm     ( - )              MV PHT:      0.062 s ( - )



LVOTd        1.8 cm     1.8 cm mm              MVA (PHT):   3.5 s ( -

)

Visual EF:   60 % 

RVDd(2D):    2.7 cm     (1.9 cm-3.8 



cmmm)  



Continued Measurements: 

Chambers                     Valvular Assessment AV/MV

Valvular Assessment TV/PV



Name                     Value             Name

Value      Name                     Value

LADs:                  2.8 cm                  MV DecTime:

194 m/s       CVP (est.):             5 mmHg



MV E' Septal:         0.10  m/s   

MV E/E' Septal:       9.30   

MV E/E' Lateral:      7.50   



Additional Vessels  

Patient: JOO ARITA                            MRN: [id number]

Study Date: 2019   Page 1 of 2



02:32 PM 









Name                      Value  

Ao Ascendin.8 cm    

Inferior Vena Cava:    1.6 cm    



Findings:              Left Ventricle: 

Normal size left ventricle. No LV hypertrophy. Normal global systolic

LV function. The ejection

fraction is visually estimated to be 60 %. No regional wall motion

abnormality. Normal diastolic LV

function.  

Right Ventricle: 

Mildly dilated right ventricle. Normal RV function.  

Left Atrium: 

The left atrium is normal in size.  

Right Atrium: 

The right atrium is normal in size.  

Mitral Valve: 

The mitral valve is normal in appearance and function. Trivial to mild

mitral regurgitation. No mitral

stenosis is present.  

Aortic Valve: 

The aortic valve is tri-leaflet. There is no significant aortic valve

regurgitation. No aortic valve

stenosis is present.  

Tricuspid Valve: 

The tricuspid valve is normal in appearance and function. Mild

tricuspid regurgitation is present. The

pulmonary artery pressure is mildly increased. Right ventricular

systolic pressure measures

41mmHg.  

Pulmonic Valve: 

The pulmonic valve is normal in appearance and function.  

Aorta: 

The aorta is normal. Normal size aortic root measuring 2.7 cm. Normal

size ascending aorta

measuring 2.8 cm.  

IVC: 

The IVC is normal sized.  

Pericardium: 

No pericardial effusion.  

Exam Comments: 

Technically difficult, patient very thin, breast implants, on oxygen. 







Electronically signed by Duong Nicole MD on 2019 at 03:32 PM 

(No Signature Object) 



Patient: JOO ARITA                            MRN: [id number]

Study Date: 2019   Page 2 of 2

02:32 PM 







[id number]

## 2019-02-08 LAB — PLATELET # BLD: 160 10^3/UL (ref 150–400)

## 2019-02-08 RX ADMIN — AZITHROMYCIN SCH MG: 250 TABLET, FILM COATED ORAL at 07:47

## 2019-02-08 RX ADMIN — OXYCODONE HYDROCHLORIDE PRN MG: 15 TABLET ORAL at 04:06

## 2019-02-08 RX ADMIN — LEVOTHYROXINE SODIUM SCH MCG: 50 TABLET ORAL at 07:47

## 2019-02-08 RX ADMIN — MORPHINE SULFATE SCH MG: 15 TABLET, FILM COATED, EXTENDED RELEASE ORAL at 20:37

## 2019-02-08 RX ADMIN — CITALOPRAM HYDROBROMIDE SCH MG: 20 TABLET ORAL at 07:47

## 2019-02-08 RX ADMIN — ENOXAPARIN SODIUM SCH MG: 100 INJECTION SUBCUTANEOUS at 20:37

## 2019-02-08 RX ADMIN — PANTOPRAZOLE SODIUM SCH MG: 40 TABLET, DELAYED RELEASE ORAL at 07:47

## 2019-02-08 RX ADMIN — ENOXAPARIN SODIUM SCH MG: 100 INJECTION SUBCUTANEOUS at 08:50

## 2019-02-08 RX ADMIN — ALBUTEROL SULFATE PRN ML: 2.5 SOLUTION RESPIRATORY (INHALATION) at 03:40

## 2019-02-08 RX ADMIN — OXYCODONE HYDROCHLORIDE PRN MG: 15 TABLET ORAL at 17:55

## 2019-02-08 RX ADMIN — MORPHINE SULFATE SCH MG: 15 TABLET, FILM COATED, EXTENDED RELEASE ORAL at 07:47

## 2019-02-08 NOTE — PDINTPN
Intensivist Progress Note


Assessment/Plan: 


Assessment/plan:


* Metastatic breast cancer with Mets skin lung and bone


* Diffuse pulmonary infiltrates-etiology which is unclear.


* Acute hypoxemic respiratory failure-secondary to above.  Etiology which is 

unclear.  Echo shows good ejection fraction.  Oxygen requirements are markedly 

elevated from yesterday.  Neither Paclitaxel or neratinib are known to cause 

pneumonitis


      -check chest x-ray


      -continue high-dose steroids


* Peripheral neuropathy


* VT prophylaxis


* Stress ulcer prophylaxis

















Subjective: 





Sitting up in bed.  On high-flow oxygen.  Comfortable.  Slept well last night.


Objective: 





 Vital Signs











Temp Pulse Resp BP Pulse Ox


 


 36.8 C   69   22 H  90/56 L  91 L


 


 02/08/19 07:53  02/08/19 07:53  02/08/19 07:53  02/08/19 07:53  02/08/19 07:53








 Laboratory Results





 02/08/19 08:50 





 











 02/07/19 02/08/19 02/09/19





 05:59 05:59 05:59


 


Intake Total  500 


 


Balance  500 














- Time Spent With Patient


Time Spent With Patient: 





35 min of time spent with patient, over 1/2 involved with coordination of care 

or counseling.


Case discussed with nursing





Physical Exam





- Physical Exam


General Appearance: alert, mild distress


EENT: PERRL/EOMI


Neck: non-tender, full range of motion


Respiratory: respiratory distress (Moderate), crackles, wheezing


Cardiac/Chest: normal peripheral pulses, regular rate, rhythm, systolic murmur


Peripheral Pulses: 2+: carotid (R), carotid (L), femoral (R), femoral (L), 

dorsalis-pedis (R), dorsalis-pedis (L)


Abdomen: normal bowel sounds, non-tender, soft


Pelvic Exam: deferred


Rectal: deferred


Skin: normal color, warm/dry


Extremities: normal range of motion, non-tender, normal inspection, normal 

capillary refill


Neuro/Psych: no motor/sensory deficits, alert, normal mood/affect, oriented x 3





ICD10 Worksheet


Patient Problems: 


 Problems











Problem Status Onset


 


Adenocarcinoma of breast Acute  


 


Breast cancer metastasized to brain Acute

## 2019-02-08 NOTE — ASMTCMCOM
CM Note

 

CM Note                       

Notes:

Patient to meet with Palliative Care Team today. CM will follow.

 

Date Signed:  02/08/2019 11:28 AM

Electronically Signed By:Vivian Buchanan LCSW

## 2019-02-08 NOTE — SOAPPROG
SOAP Progress Note


Assessment/Plan: 


Assessment:





- metastatic breast CA - recent treatment with paclitaxel and neratinib - it is 

somewhat unclear, but her current pneumonitis may be treatment related.


- pneumonitis/resp failure - Her O2 requirements are increasing. The plan is to 

transfer her to the SDU for closer monitoring and optimization. Although not 

common, paclitaxel has been associated with pneumonitis (reported in <1% of 

patients exposed to this drug).





The patient's goal is to improve enough to get back home. She is currently 

being treat with maximal medical therapy. The patient and her family reaffirm 

that she DOES NOT WISH TO BE INTUBATED








Plan:





- continue maximal medical therapy


- I discussed the patient's current status with her primary oncologist (Dr. Karine Villegas), who will be rounding for us over the weekend.








Subjective: 





Feels like she is slightly less short of breath this PM than this AM


Objective: 





 Vital Signs











Temp Pulse Resp BP Pulse Ox


 


 36.8 C   67   21 H  102/53 L  92 


 


 02/08/19 07:53  02/08/19 11:41  02/08/19 11:41  02/08/19 11:41  02/08/19 11:41








 Laboratory Results





 02/08/19 08:50 





 02/08/19 08:50 





 











 02/06/19 02/07/19 02/08/19





 23:59 23:59 23:59


 


Intake Total 25 400 100


 


Output Total   450


 


Balance 25 400 -350














Physical Exam





- Physical Exam


General Appearance: moderate distress


Respiratory: respiratory distress, accessory muscle use, rales (diffuse)


Cardiac/Chest: tachycardia


Neuro/Psych: No depressed affect





ICD10 Worksheet


Patient Problems: 


 Problems











Problem Status Onset


 


Adenocarcinoma of breast Acute  


 


Breast cancer metastasized to brain Acute

## 2019-02-08 NOTE — HOSPPROG
Hospitalist Progress Note


Assessment/Plan: 





68-year-old female with past medical history of stage IV metastatic breast 

cancer, with mets to the brain bone and lung admitted with acute pulmonary 

embolism, acute hypoxemic respiratory failure, and pneumonitis.





DIAGNOSES: 


* Acute hypoxemic respiratory failure


* Pulmonary embolus, acute


* Severe diffuse bilateral pneumonitis, infectious verses inflammatory due to 

cancer therapy


* Metastatic breast cancer stage IV, on paclitaxel and neratinib; known mets to 

brain and lung


* Chronic pain of cancer, on analgesics


* Anemia related to malignancy and its treatment


* Peripheral neuropathy from breast cancer meds


* Depression


* Memory loss





PLANS:


* Continue Vapotherm therapy for hypoxemia


* Continue Lovenox at this time


* Continue Rocephin azithromycin


* Continue steroids currently Solu-Medrol 125 q.6


* Diuresis ordered to see if this will help get better aeration of lungs and 

oxygenation


* Continue current pain regimen


* Stress ulcer prophylaxis





Seen on hospitalist rounds as well as multidisciplinary ICU rounds today


Reviewed with Dr. Cosme


Continue care in SDU at this time due to tenuous respiratory status and need 

for ongoing Vapotherm therapy





SUBJECTIVE:


Patient states she feels reasonably comfortable now, is much more comfortable 

on the Vapotherm device than she was on other set ups so far


Denies chest pain, abdominal pain, palpitations, chills or sweats





OBJECTIVE


Vitals reviewed:  Blood pressures occasionally low to 90/56; respirations rapid

, pulse normal, temperature normal


Cardiac Monitor, my review:  Sinus





Exam:


alert oriented 


skin warm dry color ok


resps not labored


lungs good air movement but marked good rhonchorous breath sounds with some 

rales


heart regular


abd soft nondistended nontender, bowel sounds present


limbs warm, no edema





iv site ok





Lab data:


WBC up to 13,000 likely due to steroids


Hemoglobin slightly improved


Platelets stable


Slightly low CO2 on chemistry likely due to borderline high chloride


Otherwise stable chemistry panel





Imaging:


I reviewed images from today's portable chest x-ray showing diffuse extensive 

bilateral infiltrates unchanged from the prior day


Objective: 


 Vital Signs











Temp Pulse Resp BP Pulse Ox


 


 36.8 C   69   22 H  90/56 L  91 L


 


 02/08/19 07:53  02/08/19 07:53  02/08/19 07:53  02/08/19 07:53  02/08/19 07:53








 Laboratory Results





 02/08/19 08:50 





 02/08/19 08:50 





 











 02/07/19 02/08/19 02/09/19





 06:59 06:59 06:59


 


Intake Total  500 


 


Balance  500 














- Time Spent With Patient


Time Spent with Patient: greater than 35 minutes


Time Spent with Patient: Greater than 35 minutes spent on this patients care, 

greater than 50% of time spent counseling, educating, and coordinating care 

regarding the above mentioned plan.





ICD10 Worksheet


Patient Problems: 


 Problems











Problem Status Onset


 


Adenocarcinoma of breast Acute  


 


Breast cancer metastasized to brain Acute

## 2019-02-09 VITALS — DIASTOLIC BLOOD PRESSURE: 68 MMHG | SYSTOLIC BLOOD PRESSURE: 134 MMHG

## 2019-02-09 LAB — PLATELET # BLD: 126 10^3/UL (ref 150–400)

## 2019-02-09 RX ADMIN — AZITHROMYCIN SCH MG: 250 TABLET, FILM COATED ORAL at 09:02

## 2019-02-09 RX ADMIN — MORPHINE SULFATE SCH MG: 15 TABLET, FILM COATED, EXTENDED RELEASE ORAL at 08:56

## 2019-02-09 RX ADMIN — LEVOTHYROXINE SODIUM SCH MCG: 50 TABLET ORAL at 08:57

## 2019-02-09 RX ADMIN — MORPHINE SULFATE SCH MG: 15 TABLET, FILM COATED, EXTENDED RELEASE ORAL at 20:53

## 2019-02-09 RX ADMIN — OXYCODONE HYDROCHLORIDE PRN MG: 15 TABLET ORAL at 03:21

## 2019-02-09 RX ADMIN — OXYCODONE HYDROCHLORIDE PRN MG: 15 TABLET ORAL at 16:26

## 2019-02-09 RX ADMIN — ENOXAPARIN SODIUM SCH MG: 100 INJECTION SUBCUTANEOUS at 20:53

## 2019-02-09 RX ADMIN — PANTOPRAZOLE SODIUM SCH MG: 40 TABLET, DELAYED RELEASE ORAL at 08:57

## 2019-02-09 RX ADMIN — ENOXAPARIN SODIUM SCH MG: 100 INJECTION SUBCUTANEOUS at 09:01

## 2019-02-09 RX ADMIN — CITALOPRAM HYDROBROMIDE SCH MG: 20 TABLET ORAL at 08:56

## 2019-02-09 NOTE — HOSPPROG
Hospitalist Progress Note


Assessment/Plan: 





68-year-old female with past medical history of stage IV metastatic breast 

cancer, with mets to the brain, bone, and lung admitted with acute pulmonary 

embolism, acute hypoxemic respiratory failure, and pneumonitis.





#  acute hypoxemic respiratory failure with ground-glass opacities noted on CT 

angiogram.


* Neither chemotherapeutic agents known to cause pneumonitis


* Possible viral pneumonitis as cause for her significant hypoxemia


* Doubt PE symptomatic.


* Continue steroids, increased per Pulmonary


* Check high-resolution CT scan for further evaluation


* Continue high-flow oxygen





#  pulmonary embolus noted on admitting CT angiogram this is small subsegmental 

PE and her right side, likely asymptomatic.


* Currently on Lovenox





#  Metastatic breast cancer stage IV, on paclitaxel and neratinib; known mets 

to brain and lung


* Appreciate oncology follow-up


* Chronic pain due to cancer, on analgesia





#  chronic anemia





#  peripheral neuropathy secondary to chemotherapy





#  depression and memory loss











Subjective: Patient new to me and chart reviewed.  Discussed at 

multidisciplinary rounds.  Comfortable but quite weak and dyspneic with minimal 

exertion.


Objective: 


 Vital Signs











Temp Pulse Resp BP Pulse Ox


 


 36.6 C   69   23 H  115/47 L  91 L


 


 02/09/19 12:00  02/09/19 12:00  02/09/19 12:00  02/09/19 08:00  02/09/19 12:00








 Laboratory Results





 02/09/19 06:30 





 02/09/19 06:30 





 











 02/08/19 02/09/19 02/10/19





 05:59 05:59 05:59


 


Intake Total 500 1750 


 


Output Total  850 


 


Balance 500 900 














- Physical Exam


Constitutional: chronically ill appearing


Eyes: PERRL


Ears, Nose, Mouth, Throat: dry mucous membranes


Cardiovascular: regular rate and rhythym


Respiratory: bronchial breath sounds, respiratory distress (Mild)


Gastrointestinal: soft, non-tender abdomen


Genitourinary: no bladder fullness


Skin: warm, No normal color (Pale)


Musculoskeletal: generalized weakness


Neurologic: weakness, No facial droop


Psychiatric: interacting appropriately





ICD10 Worksheet


Patient Problems: 


 Problems











Problem Status Onset


 


Breast cancer metastasized to brain Acute  


 


Adenocarcinoma of breast Acute

## 2019-02-09 NOTE — PDINTPN
Intensivist Progress Note


Assessment/Plan: 


Assessment/plan:


* Metastatic breast cancer with Mets skin lung and bone


* Diffuse pulmonary infiltrates-etiology which is unclear.


* Acute hypoxemic respiratory failure-secondary to above.  Etiology which is 

unclear.  Echo shows good ejection fraction.  Oxygen requirements still remain 

high.  Patient currently on Vapotherm.  Neither Paclitaxel or neratinib are 

known to cause pneumonitis.  Feels this is likely viral


      -will check high-resolution CT scan of the chest in the morning


      -increased steroids


* Peripheral neuropathy


* VT prophylaxis


* Stress ulcer prophylaxis


* Nutrition-adequate


* PT/OT-on hold secondary to oxygen demands











Subjective: 





Resting comfortably in bed.  Breathless with exertion.


Objective: 





 Vital Signs











Temp Pulse Resp BP Pulse Ox


 


 36.6 C   66   18   119/63   93 


 


 02/08/19 20:00  02/09/19 04:00  02/09/19 04:00  02/09/19 04:00  02/09/19 04:00








 Laboratory Results





 02/09/19 06:30 





 02/09/19 06:30 





 











 02/08/19 02/09/19 02/10/19





 05:59 05:59 05:59


 


Intake Total 500 1750 


 


Output Total  850 


 


Balance 500 900 














- Time Spent With Patient


Time Spent With Patient: 





35 min of time spent with patient, over 1/2 involved with coordination of care 

or counseling.


Case discussed with nurse.


Had a long discussion with the patient's family regarding questionable 

prognosis.





Physical Exam





- Physical Exam


General Appearance: alert, no apparent distress


EENT: PERRL/EOMI


Neck: non-tender, supple


Respiratory: chest non-tender, rhonchi (Scattered), No respiratory distress, No 

wheezing


Cardiac/Chest: normal peripheral pulses, regular rate, rhythm


Peripheral Pulses: 2+: carotid (R), carotid (L), femoral (R), femoral (L), 

dorsalis-pedis (R), dorsalis-pedis (L)


Abdomen: normal bowel sounds, non-tender, soft


Pelvic Exam: deferred


Rectal: deferred


Skin: normal color, warm/dry


Extremities: normal range of motion, non-tender, normal inspection, normal 

capillary refill


Neuro/Psych: no motor/sensory deficits, alert, normal mood/affect, oriented x 3





ICD10 Worksheet


Patient Problems: 


 Problems











Problem Status Onset


 


Adenocarcinoma of breast Acute  


 


Breast cancer metastasized to brain Acute

## 2019-02-09 NOTE — SOAPPROG
SOAP Progress Note


Assessment/Plan: 


Assessment:














SOAP Progress Note


Assessment/Plan: 


Assessment:





- metastatic breast CA - multiple prior therapies, with recent treatment of 

paclitaxel, neratinib, herceptin - 


- pneumonitis/resp failure - High O2 requirements. Possibly viral, possibly 

taxol related. Treatment for both is high dose steroids, which she is on. 


Will wait to see how she responds to the steroids in next 24-48 hours. Limited 

treatment options even if she recovers from this. 





Patient is comfortable despite high O2 requirements. If situation deteriorates 

further, comfort care would be her desired course. She has previously made 

arrangements for Medical [facility] in Dying.














Plan:





- continue maximal medical therapy




















Plan:





02/09/19 19:07





Subjective: 





dyspneic, but comfortable


Objective: 





 Vital Signs











Temp Pulse Resp BP Pulse Ox


 


 36.7 C   75   24 H  127/60 H  94 


 


 02/09/19 15:48  02/09/19 15:48  02/09/19 15:48  02/09/19 15:48  02/09/19 15:48








 Laboratory Results





 02/09/19 06:30 





 02/09/19 06:30 





 











 02/08/19 02/09/19 02/10/19





 05:59 05:59 05:59


 


Intake Total 500 1750 575


 


Output Total  850 300


 


Balance 500 900 275














Physical Exam





- Physical Exam


General Appearance: mild distress


Respiratory: wheezing


Cardiac/Chest: regular rate, rhythm, tachycardia, other


Abdomen: soft





ICD10 Worksheet


Patient Problems: 


 Problems











Problem Status Onset


 


Adenocarcinoma of breast Acute  


 


Breast cancer metastasized to brain Acute

## 2019-02-10 RX ADMIN — HYDROMORPHONE HYDROCHLORIDE PRN MG: 1 INJECTION, SOLUTION INTRAMUSCULAR; INTRAVENOUS; SUBCUTANEOUS at 14:41

## 2019-02-10 RX ADMIN — AZITHROMYCIN SCH: 250 TABLET, FILM COATED ORAL at 10:39

## 2019-02-10 RX ADMIN — HYDROMORPHONE HYDROCHLORIDE PRN MG: 1 INJECTION, SOLUTION INTRAMUSCULAR; INTRAVENOUS; SUBCUTANEOUS at 20:33

## 2019-02-10 RX ADMIN — MORPHINE SULFATE SCH MG: 15 TABLET, FILM COATED, EXTENDED RELEASE ORAL at 10:37

## 2019-02-10 RX ADMIN — HYDROMORPHONE HYDROCHLORIDE PRN MG: 1 INJECTION, SOLUTION INTRAMUSCULAR; INTRAVENOUS; SUBCUTANEOUS at 10:46

## 2019-02-10 RX ADMIN — MORPHINE SULFATE SCH MG: 15 TABLET, FILM COATED, EXTENDED RELEASE ORAL at 20:55

## 2019-02-10 RX ADMIN — ENOXAPARIN SODIUM SCH: 100 INJECTION SUBCUTANEOUS at 10:40

## 2019-02-10 RX ADMIN — CITALOPRAM HYDROBROMIDE SCH: 20 TABLET ORAL at 10:39

## 2019-02-10 RX ADMIN — LEVOTHYROXINE SODIUM SCH MCG: 50 TABLET ORAL at 06:16

## 2019-02-10 RX ADMIN — PANTOPRAZOLE SODIUM SCH: 40 TABLET, DELAYED RELEASE ORAL at 10:40

## 2019-02-10 RX ADMIN — OXYCODONE HYDROCHLORIDE PRN MG: 15 TABLET ORAL at 03:22

## 2019-02-10 RX ADMIN — ONDANSETRON PRN MG: 2 SOLUTION INTRAMUSCULAR; INTRAVENOUS at 21:18

## 2019-02-10 NOTE — HOSPPROG
Hospitalist Progress Note


Assessment/Plan: 





68-year-old female with past medical history of stage IV metastatic breast 

cancer, with mets to the brain, bone, and lung admitted with acute pulmonary 

embolism, acute hypoxemic respiratory failure, and pneumonitis.  Overall 

prognosis poor.  Discussed in multidisciplinary rounds.





#  acute hypoxemic respiratory failure with ground-glass opacities noted on CT 

angiogram.


* Neither chemotherapeutic agents known to cause pneumonitis


* Possible viral pneumonitis as cause for her significant hypoxemia


* Doubt PE symptomatic.


* Continue steroids high-flow oxygen, long discussion with patient and Oncology 

regarding possible transition to comfort care.  She is considering this but has 

not yet made a decision.


* Transfer to PCU, patient is stable on current oxygen needs no significant 

improvement despite high-dose steroids





#  pulmonary embolus noted on admitting CT angiogram this is small subsegmental 

PE and her right side, likely asymptomatic.


* Currently on Lovenox





#  Metastatic breast cancer stage IV, on paclitaxel and neratinib; known mets 

to brain and lung


* Appreciate oncology follow-up


* Chronic pain due to cancer, on analgesia





#  chronic anemia





#  peripheral neuropathy secondary to chemotherapy





#  depression and memory loss











Subjective: Patient discussed in multidisciplinary rounds


Objective: 


 Vital Signs











Temp Pulse Resp BP Pulse Ox


 


 36.7 C   62   17   134/68 H  92 


 


 02/09/19 15:48  02/10/19 08:00  02/10/19 08:00  02/09/19 20:00  02/10/19 08:00








 Laboratory Results





 02/09/19 06:30 





 02/09/19 06:30 





 











 02/09/19 02/10/19 02/11/19





 05:59 05:59 05:59


 


Intake Total 5662 679 


 


Output Total 793 700 


 


Balance 900 -25 














ICD10 Worksheet


Patient Problems: 


 Problems











Problem Status Onset


 


Breast cancer metastasized to brain Acute  


 


Adenocarcinoma of breast Acute

## 2019-02-10 NOTE — PDINTPN
Intensivist Progress Note


Assessment/Plan: 


Assessment/plan:


* Metastatic breast cancer with Mets skin lung and bone


* Diffuse pulmonary infiltrates-etiology which is unclear.  No improvement 

despite best efforts


* Acute hypoxemic respiratory failure-secondary to above.  Etiology which is 

unclear.  Echo shows good ejection fraction.  Oxygen requirements still remain 

high.  Patient currently on Vapotherm.   neratinib is not known to cause 

pneumonitis.  Feels this is likely viral


      -will check high-resolution CT scan of the chest in the morning


      -increased steroids


* Peripheral neuropathy


* VT prophylaxis


* Stress ulcer prophylaxis


* Nutrition-adequate


* PT/OT-on hold secondary to oxygen demands 


* Disposition-likely comfort care withdrawals poor later on this afternoon.  

Will transfer patient to progressive care unit as she is on high-flow oxygen.





Subjective: 





Very weak and tired.  


Objective: 





 Vital Signs











Temp Pulse Resp BP Pulse Ox


 


 36.7 C   62   17   134/68 H  92 


 


 02/09/19 15:48  02/10/19 08:00  02/10/19 08:00  02/09/19 20:00  02/10/19 08:00








 Laboratory Results





 02/09/19 06:30 





 02/09/19 06:30 





 











 02/09/19 02/10/19 02/11/19





 05:59 05:59 05:59


 


Intake Total 1750 675 


 


Output Total 850 700 


 


Balance 900 -25 














- Time Spent With Patient


Time Spent With Patient: 





35 min of time spent with patient, over 1/2 involved with coordination of care 

counseling.


Case discussed with Nursing and Oncology





Physical Exam





- Physical Exam


General Appearance: alert, other (Drowsy)


EENT: PERRL/EOMI, other (Vapotherm)


Neck: non-tender, supple


Respiratory: respiratory distress, crackles (Scattered), other (Poor air 

movement)


Cardiac/Chest: normal peripheral pulses, regular rate, rhythm


Abdomen: normal bowel sounds, non-tender, soft


Pelvic Exam: deferred


Rectal: deferred


Skin: normal color, warm/dry


Extremities: normal range of motion, non-tender, normal inspection, normal 

capillary refill


Neuro/Psych: other (Somnolent)





ICD10 Worksheet


Patient Problems: 


 Problems











Problem Status Onset


 


Adenocarcinoma of breast Acute  


 


Breast cancer metastasized to brain Acute

## 2019-02-10 NOTE — SOAPPROG
SOAP Progress Note


Assessment/Plan: 


Assessment:














SOAP Progress Note


Assessment/Plan: 


Assessment:





- metastatic breast CA - multiple prior therapies, with recent treatment of 

paclitaxel, neratinib, herceptin. Limited remaining options


- pneumonitis/resp failure - High O2 requirements. Possibly viral, possibly 

taxol related. Treatment for both is high dose steroids, which she is on. No 

significant improvement over night. Having discussions with patient and family 

about withdrawing care. No final decisions as of yet. 





Discussed with family, Dr. Cosme





Consider transfer to oncology gonzalez.





Subjective: 





intermittently coherent


Objective: 





 Vital Signs











Temp Pulse Resp BP Pulse Ox


 


 36.7 C   62   17   134/68 H  92 


 


 02/09/19 15:48  02/10/19 08:00  02/10/19 08:00  02/09/19 20:00  02/10/19 08:00








 Laboratory Results





 02/09/19 06:30 





 02/09/19 06:30 





 











 02/09/19 02/10/19 02/11/19





 05:59 05:59 05:59


 


Intake Total 1750 675 


 


Output Total 850 700 


 


Balance 900 -25 














Physical Exam





- Physical Exam


General Appearance: mild distress





ICD10 Worksheet


Patient Problems: 


 Problems











Problem Status Onset


 


Adenocarcinoma of breast Acute  


 


Breast cancer metastasized to brain Acute

## 2019-02-11 RX ADMIN — ATROPINE SULFATE PRN DROP: 10 SOLUTION/ DROPS OPHTHALMIC at 05:23

## 2019-02-11 RX ADMIN — CITALOPRAM HYDROBROMIDE SCH: 20 TABLET ORAL at 09:18

## 2019-02-11 RX ADMIN — MORPHINE SULFATE SCH: 15 TABLET, FILM COATED, EXTENDED RELEASE ORAL at 09:19

## 2019-02-11 RX ADMIN — LEVOTHYROXINE SODIUM SCH: 50 TABLET ORAL at 06:26

## 2019-02-11 RX ADMIN — ATROPINE SULFATE PRN DROP: 10 SOLUTION/ DROPS OPHTHALMIC at 06:41

## 2019-02-11 RX ADMIN — ATROPINE SULFATE PRN DROP: 10 SOLUTION/ DROPS OPHTHALMIC at 03:08

## 2019-02-11 RX ADMIN — PANTOPRAZOLE SODIUM SCH: 40 TABLET, DELAYED RELEASE ORAL at 09:19

## 2019-02-11 RX ADMIN — ATROPINE SULFATE PRN DROP: 10 SOLUTION/ DROPS OPHTHALMIC at 04:40

## 2019-02-11 RX ADMIN — HYDROMORPHONE HYDROCHLORIDE PRN MG: 1 INJECTION, SOLUTION INTRAMUSCULAR; INTRAVENOUS; SUBCUTANEOUS at 06:49

## 2019-02-11 NOTE — ASMTLACE
LACE

 

Length of stay for            Answers:  4-6 days                              

current admission                                                             

Acuity / Level of             Answers:  Yes                                   

Care: Did the patient                                                         

have an inpatient                                                             

admission?                                                                    

Comorbidities - select        Answers:  Any tumor (including                  

all that apply                          lymphoma or leukemia)                 

                                        Other                         Notes:  Hypothyroid; Peripheral
 

                                                                              neuropathy

# of Emergency department     Answers:  1-2                                   

visits in the last 6                                                          

months                                                                        

Social determinants           Answers:  Mental health diagnosis               

                                        (anxiety, depression, pers            

                                        onality disorders, etc.)              

Score: 14

 

Date Signed:  02/11/2019 10:06 AM

Electronically Signed By:Kinza Red RN

## 2019-02-11 NOTE — GDS
[f rep st]



                                                             DISCHARGE SUMMARY





DEATH NOTE



DIAGNOSIS:  

1.  Acute hypoxemic respiratory failure secondary to diffuse pulmonary infiltrates, etiology unclear,
 no improvement despite best efforts, and had been on high-flow oxygen.

2.  Metastatic breast cancer with metastases to the skin, bone, and lung.

3.  Peripheral neuropathy.

4.  Small pulmonary embolus noted on CT scan.

5.  Chronic anemia.

6.  Depression and memory loss.



HOSPITAL COURSE:  A 68-year-old with a history of stage IV metastatic breast cancer, admitted with ac
[place] hypoxic respiratory failure and pneumonitis.  CTA on admission did show subsegmental pulmonary em
bolus as well as diffuse infiltrates, ground-glass infiltrates.  She was admitted to the intensive ca
re unit.  She was a do not resuscitate, do not intubate, and was placed on Vapotherm high-flow oxygen
.  She was treated in the intensive care unit with high-dose steroids, nebulizers. 



Despite therapies, the patient continued to do poorly, having minimal reserves on high-flow oxygen.  
Oncology was also involved in her care and felt that her chemotherapeutic agents were not felt to cau
se pneumonitis, and there was a concern for possible viral pneumonitis.  After several days in the in
tensive care unit with no significant improvement in her status, a family conference was held with he
r primary oncologist as well as the intensivist, and they discussed transition to comfort care.  She 
was eventually transitioned to comfort care, and transferred to the oncology unit, and transitioned o
ff high-flow oxygen.  She  on  at 8:25 in the morning.  Vital signs ceased, and family
 were present at the bedside.  The patient was on comfort care at the time of her passing.





Job #:  [id number]

## 2023-09-27 NOTE — GCON
[f rep st]



                                                                    CONSULTATION





INTENSIVIST CONSULTATION





REASON FOR ADMISSION:  Hypoxemic respiratory failure, pulmonary infiltrates, metastatic breast cancer
.



HISTORY OF PRESENT ILLNESS:  The patient is a 68-year-old white female with a past medical history in
cluding metastatic breast cancer with mets to skin, lung and bone.  She also has peripheral neuropath
y, depression, and anemia.  She began a new anticancer therapy called neratinib in January.  She was 
complaining of significant fatigue.  She was brought to the emergency room, found to be markedly hypo
xemic, and admitted to the hospital with a diagnosis of bilateral pneumonia.  She was begun on broad-
spectrum antibiotics.  Over the last 24 hours, she has had a worsening breathlessness, as well as hyp
oxemia.  She is currently on Vapotherm and satting quite well.  She admits to a cough that is distinc
tly nonproductive.  There is no chest pain, pleuritic-type chest pain or angina equivalent.  There is
 no fever or night sweats.  I was asked to see the patient by Dr. Juma Duke.



ALLERGIES:  No known allergies to medication.



PAST MEDICAL HISTORY:  Significant for depression, peripheral neuropathy, metastatic breast cancer.



PAST SURGERIES:  She has had a bilateral mastectomy.



FAMILY HISTORY:  Noncontributory.



SOCIAL HISTORY:  No tobacco use.  No history of alcohol use.  She is  and has excellent family
 support.



MEDICATIONS:  At home include Celexa, Tylenol, vitamin C, Synthroid, Nerlynx, Zofran, Protonix, MS Co
ntin, oxycodone.



REVIEW OF SYSTEMS:  10-point review of systems was performed and negative except for what is listed i
n HPI.



PHYSICAL EXAM:  VITAL SIGNS:  Blood pressure 115/58, pulse 72, respiration 18, temperature 36.7, oxyg
en saturation 95% on Vapotherm.  GENERAL:  She is a thin, 68-year-old white female who is in mild res
piratory distress.  HEENT:  Eyes are PERRLA, EOMI.  Throat shows no erythema or tonsillar hypertrophy
.  NECK:  Supple.  There is no cervical adenopathy.  HEART:  Regular rate and rhythm with a 2/6 systo
lic murmur at left sternal border without radiation.  LUNGS:  Show diminished breath sounds and few b
ibasilar crackles, but there is no wheeze.  ABDOMEN:  Soft, nontender.  Bowel sounds are present in a
ll 4 quadrants.  EXTREMITIES:  Show no clubbing, cyanosis, or edema.



LABORATORIES:  White count is 10.2, hemoglobin 9, hematocrit 26, platelet count is 188, MCV is 101.  
Sodium 135, potassium 3.3, chloride 110, CO2 is 20, BUN 16, creatinine 0.7, glucose is 106.  Arterial
 blood gas:  pH 7.45, pCO2 of 26, pO2 of 59, bicarb 18, oxygen saturation 91%.  Chest x-ray reveals g
round-glass opacifications in all lung fields.  CT scan of the chest dated January 5, 2019:  There is
 a subsegmental pulmonary embolus in the right middle lobe.  There are bilateral ground-glass opacifi
cations.  There are multiple pulmonary nodules in the left lower lobe.  There is mucus plugging in th
e bronchi.



IMPRESSION:  

1.  Pulmonary embolus.

2.  Diffuse pneumonia, which is likely community-acquired, but given the fact that she is on chemothe
rapy, must take into consideration that she is somewhat immunocompromised.

3.  Acute hypoxemic respiratory failure.

4.  Metastatic breast cancer.

5.  Depression.

6.  Anemia.



RECOMMENDATIONS:  

1.  Agree with current medications.

2.  Anticoagulation.  Patient currently on enoxaparin.

3.  Agree with starting IV steroids.  She is currently on methylprednisolone 60 mg q.6.  We will incr
ease this to 125.

4.  Continue high-flow oxygen and wean as tolerated.

5.  DVT and PE prophylaxis.  

6.  Stress ulcer prophylaxis.

7.  Adequate pain control.





Job #:  [id number]
[f rep st]



                                                                    CONSULTATION





MEDICAL ONCOLOGY FOLLOWUP CONSULTATION



REFERRING PHYSICIAN:  Maida Ernandez NP





REASON FOR CONSULTATION:  Ongoing management of relapsed refractory breast cancer, pulmonary embolism
, and hypoxia.



RECOMMENDATIONS:  

1.  Agree with empiric antibiotic therapy of the patient's pulmonary infiltrates.

2.  Agree with Lovenox for her pulmonary embolism.

3.  Would consider placing patient on steroids if her respiratory status deteriorates.

4.  Further treatment of the patient's metastatic breast cancer will need to be discussed after her s
tabilization.  The question will be whether we think that the pneumonitis ultimately is related to ei
ther her paclitaxel or neratinib.



ASSESSMENT:  This is a 68-year-old white female with history of metastatic breast cancer that is trip
le positive, is now admitted with increasing shortness of breath and weakness.  This came on over a p
eriod of several days.  She was very lightheaded.  She was admitted to [facility] and
 found to have right lower lobe pulmonary embolism, as well as significant hypoxia, and changes consi
stent pneumonitis in her lungs, as well as enlarging nodules.  It is currently unclear as to the etio
logy of her pneumonitis.  This could potentially be related to infection, but evaluation for a viral 
etiology was negative.  She could have potentially interstitial infiltration with her breast cancer o
r could potentially be a pneumonitis related to paclitaxel.  I do not see where neratinib was associa
amber with pneumonitis, but there was a temporal relation to her increasing shortness of breath and sta
rting the neratinib. 





The patient understands she is nearly at the end of her options for therapy.  She wishes to be do not
 resuscitate and has also been evaluating her rights under the Medical Aid in Dying Act; however, hav
ing said that, she is hopeful that she will have some good quality time left and that is her current 
goal.  Her history of her breast cancer dates back to May of 2014.  At that time, she had a T3 N2a di
sease.  She has had metastatic disease to her bones, lung, brain, and scalp.  She was receptor positi
ve, progesterone receptor positive, and HER-2 positive at 3+.  Her treatment thus far has included a 
total mastectomy with axillary lymph node dissection in May of 2014.  She had 4 positive lymph nodes.
  She had adjuvant Taxotere.  For metastatic disease, she has had ado-trastuzumab emtansine.  This wa
s started in January 2016 for recurrent disease initially.  She was also treated with pertuzumab and 
trastuzumab as part of her adjuvant therapy and that was in July of 2014, through the end of 2015.  S
he has had radiotherapy to metastatic sites of disease, including her scalp.  Total dose was 3000 cGy
.  Her hormonal therapy has included anastrozole and fulvestrant.  Her bone health agent has been zol
edronic acid.



PAST MEDICAL HISTORY:  Otherwise been remarkable for hypothyroidism, depression, osteoarthritis, and 
collagenous colitis.



PAST SURGICAL HISTORY:  Remarkable for right knee surgery, left shoulder surgery, and her mastectomy 
with axillary node dissection.



FAMILY HISTORY:  Remarkable for father with prostate cancer, paternal aunt with pancreatic cancer, a 
sister dying of lung cancer, and another brother who had prostate cancer.  There is no family history
 of ovarian cancer apparently.



SOCIAL HISTORY:  Patient does not smoke.  She drinks alcohol occasionally, and she has been self-empl
oyed as a .  She has 1 daughter.



REVIEW OF SYSTEMS:  Remarkable for shortness of breath and extreme fatigue.  She has had dizziness.  
She reports no significant pain, and no nausea or vomiting at this time.  Ten-system review is, other
wise, unremarkable.



PHYSICAL EXAMINATION:  GENERAL:  Reveals a pale, but alert and conversant white female.  She is curre
ntly using an oxygen at 15 L via mask.  LUNGS:  Reveal diffuse rhonchi, rales, and rubs that are loud
est in her right anterior lung field.  CARDIAC:  Shows a regular rhythm at this time.  ABDOMEN:  Norm
al bowel sounds.  Soft abdomen.  No hepatosplenomegaly.  LOWER EXTREMITIES:  Show no significant sawyer
a.



IMAGING:  She was noted on CT angiogram of the chest to have subsegmental pulmonary emboli, right mid
dle lobe.  She had bilateral ground-glass opacities, which were more prominent in the right lower lob
e.  It shows bilateral pulmonary nodules, largest of which is 1.1 x 1.2 cm and also mucous plugging w
ithin the bronchi.  There is also a 1.3 x 1.2 cm round lesion within the right breast.  She has galls
tones and mediastinal and hilar lymphadenopathy.  She is also noted to have osseous metastatic lesion
s in her sternum and ribs.



LABORATORY:  Evaluation shows white count 11.06 with a hemoglobin of 9.5 and a platelet count of 241,
000.  Her kidney function is normal with a creatinine of 0.9.  Her calcium is slightly low at 7.1. 



Thank you very much for allowing us to participate in this pleasant woman's care.  We will look forwa
rd to assisting with her management.





Job #:  [id number]
[id number]